# Patient Record
Sex: FEMALE | URBAN - METROPOLITAN AREA
[De-identification: names, ages, dates, MRNs, and addresses within clinical notes are randomized per-mention and may not be internally consistent; named-entity substitution may affect disease eponyms.]

---

## 2021-08-03 ENCOUNTER — Encounter (OUTPATIENT)
Dept: URBAN - METROPOLITAN AREA EXTERNAL CLINIC 14 | Facility: EXTERNAL CLINIC | Age: 71
End: 2021-08-03
Payer: COMMERCIAL

## 2021-08-03 ENCOUNTER — OFFICE VISIT (OUTPATIENT)
Dept: URBAN - METROPOLITAN AREA CLINIC 13 | Facility: CLINIC | Age: 71
End: 2021-08-03
Payer: COMMERCIAL

## 2021-08-03 DIAGNOSIS — Z96.1 PRESENCE OF PSEUDOPHAKIA: ICD-10-CM

## 2021-08-03 PROCEDURE — 99215 OFFICE O/P EST HI 40 MIN: CPT | Performed by: OPHTHALMOLOGY

## 2021-08-03 PROCEDURE — 67108 REPAIR DETACHED RETINA: CPT | Performed by: OPHTHALMOLOGY

## 2021-08-03 PROCEDURE — 76512 OPH US DX B-SCAN: CPT | Performed by: OPHTHALMOLOGY

## 2021-08-03 PROCEDURE — 92134 CPTRZ OPH DX IMG PST SGM RTA: CPT | Performed by: OPHTHALMOLOGY

## 2021-08-03 ASSESSMENT — INTRAOCULAR PRESSURE
OS: 45
OD: 19
OD: 19
OS: 45

## 2021-08-03 NOTE — IMPRESSION/PLAN
Impression: Corneal edema: H18.20. Plan: Has MCE OS. Likely due to elevated IOP. Likely will need to scrape epithelium during surgery.

## 2021-08-03 NOTE — IMPRESSION/PLAN
Impression: Glaucoma: H40.9. Plan: On Dorzolamide and Latanoprost. IOP still elevated. Will reassess after surgery.

## 2021-08-04 ENCOUNTER — POST-OPERATIVE VISIT (OUTPATIENT)
Dept: URBAN - METROPOLITAN AREA CLINIC 7 | Facility: CLINIC | Age: 71
End: 2021-08-04

## 2021-08-04 DIAGNOSIS — H33.332 MULTIPLE DEFECTS OF RETINA WITHOUT DETACHMENT, LEFT EYE: Primary | ICD-10-CM

## 2021-08-04 PROCEDURE — 99024 POSTOP FOLLOW-UP VISIT: CPT | Performed by: OPHTHALMOLOGY

## 2021-08-04 ASSESSMENT — INTRAOCULAR PRESSURE
OS: 29
OD: 18

## 2021-08-04 NOTE — IMPRESSION/PLAN
Impression: S/P 25g, PPV, EL, SO, interior PL, iris [place] x RRD OS - 1 Day. 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 Excellent post op course   Post operative instructions reviewed - Condition is improving - Plan: PF/Oflox QID. Continue glaucoma meds- Latanoprost, Azopt, Combigan. FDP during day sleep on right side. Driving route to [facility] discussed Gas precautions. RTC 1 week DFE OS --Advised patient to use artificial tears for comfort. --Continue Prednisolone acetate 1% and ofloxacin QID OS

## 2021-08-09 ENCOUNTER — POST-OPERATIVE VISIT (OUTPATIENT)
Dept: URBAN - METROPOLITAN AREA CLINIC 86 | Facility: CLINIC | Age: 71
End: 2021-08-09

## 2021-08-09 PROCEDURE — 99024 POSTOP FOLLOW-UP VISIT: CPT | Performed by: OPHTHALMOLOGY

## 2021-08-09 ASSESSMENT — INTRAOCULAR PRESSURE
OD: 14
OS: 15

## 2021-08-11 NOTE — IMPRESSION/PLAN
Impression: Retinal detachment with multiple breaks, left eye: H33.022.
-inferior mac off RD
-symptoms x 3 weeks Optos Colors:
OD: WNL
OS: inferior RD- poor view OCT:
OD: wnl OS: SRF through macula B-scan OS: inferior RD Plan: Examination reveals a retinal detachment. The diagnosis, natural history, and prognosis of rhegmatogenous retinal detachment, as well as the risks and benefits of intervention were discussed at length. . To reduce the risk of further visual loss, treatment is strongly recommended. The patient was informed of various surgical techniques; altitude precautions with a gas bubble, including the danger of loss of the eye with travel to a higher altitude or flying; and the possible need to update spectacle correction if a scleral buckle is used. The patient understands that the vision usually improves gradually over a period of several months to 1 year, but may not improve to prior levels. The patient elects to proceed with retinal detachment repair. Risk of redetachment, or proliferative vitreoretinopathy, scar formation, macular pucker, hyphema, glaucoma, hypotony, infection, loss of vision, loss of eye, blindness were fully explained to the patient who voices understanding and agreed to proceed with surgery PLAN: 25g PPV, EL, SO, inferior PI, iris hooks OS x RRD. Today Discussed with patient at length that this is a complex case due to elevated IOP, corneal edema, small pupil, and history of dislocated IOL. Understands that she has history of chronically poor vision in OS and that visual outcomes in this case are unpredictable.

## 2021-08-23 ENCOUNTER — POST-OPERATIVE VISIT (OUTPATIENT)
Dept: URBAN - METROPOLITAN AREA CLINIC 86 | Facility: CLINIC | Age: 71
End: 2021-08-23
Payer: COMMERCIAL

## 2021-08-23 PROCEDURE — 99024 POSTOP FOLLOW-UP VISIT: CPT | Performed by: OPHTHALMOLOGY

## 2021-08-23 ASSESSMENT — INTRAOCULAR PRESSURE
OS: 10
OD: 13

## 2021-08-23 NOTE — IMPRESSION/PLAN
Impression: S/P 25g, PPV, EL, SO, interior PL, iris [place] x RRD OS - 20 Days. 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 Excellent post op course   Post operative instructions reviewed - Condition is improving - Plan: Taper drops. Gas precautions. Sleep on side. RTC 6 weeks DFE OS OCT OS --Advised patient to use artificial tears for comfort.

## 2021-10-04 ENCOUNTER — POST-OPERATIVE VISIT (OUTPATIENT)
Dept: URBAN - METROPOLITAN AREA CLINIC 86 | Facility: CLINIC | Age: 71
End: 2021-10-04
Payer: COMMERCIAL

## 2021-10-04 DIAGNOSIS — H33.022 RETINAL DETACHMENT WITH MULTIPLE BREAKS, LEFT EYE: ICD-10-CM

## 2021-10-04 PROCEDURE — 99024 POSTOP FOLLOW-UP VISIT: CPT | Performed by: OPHTHALMOLOGY

## 2021-10-04 ASSESSMENT — INTRAOCULAR PRESSURE
OS: 20
OD: 20

## 2021-10-04 NOTE — IMPRESSION/PLAN
Impression: S/P 25G PPV, EL, SO, INFERIOR PL, IRIS HOO[place] X RRD OS - 62 Days. 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 Excellent post op course   Post operative instructions reviewed - Condition is improving -

OCT: WNL OU Plan: Doing well. Eye pain improved OS after restarting PF. PT will self-taper. Retina attached. SSRD

RTC 2 months DFE OU OCT OU --Advised patient to use artificial tears for comfort.

## 2021-11-29 ENCOUNTER — OFFICE VISIT (OUTPATIENT)
Dept: URBAN - METROPOLITAN AREA CLINIC 86 | Facility: CLINIC | Age: 71
End: 2021-11-29
Payer: COMMERCIAL

## 2021-11-29 DIAGNOSIS — H18.20 CORNEAL EDEMA: ICD-10-CM

## 2021-11-29 PROCEDURE — 99214 OFFICE O/P EST MOD 30 MIN: CPT | Performed by: OPHTHALMOLOGY

## 2021-11-29 PROCEDURE — 92134 CPTRZ OPH DX IMG PST SGM RTA: CPT | Performed by: OPHTHALMOLOGY

## 2021-11-29 RX ORDER — KETOROLAC TROMETHAMINE 5 MG/ML
0.5 % SOLUTION OPHTHALMIC
Qty: 5 | Refills: 2 | Status: INACTIVE
Start: 2021-11-29 | End: 2022-02-25

## 2021-11-29 RX ORDER — KETOROLAC TROMETHAMINE 4 MG/ML
0.4 % SOLUTION/ DROPS OPHTHALMIC
Qty: 5 | Refills: 2 | Status: INACTIVE
Start: 2021-11-29 | End: 2021-11-29

## 2021-11-29 ASSESSMENT — INTRAOCULAR PRESSURE
OD: 15
OS: 15

## 2021-11-29 NOTE — IMPRESSION/PLAN
Impression: Corneal edema: H18.20. Plan: Mild corneal edema. ? related to endothelial decompensation. Will treat with topical steroids for now.  If no improvement, consider cornea referral.

## 2021-11-29 NOTE — IMPRESSION/PLAN
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 Excellent post op course   Post operative instructions reviewed - Condition is improving -

OCT: inf CME OS Plan: Retina attached.  SSRD

## 2021-11-29 NOTE — IMPRESSION/PLAN
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 Plan: There are many etiologies of CME with the most common being low grade inflammation after intraocular surgery. The diagnosis, natural history, and prognosis of cystoid macular edema; as well as the risks and benefits of various treatment options were discussed. Possible treatment options include: topical corticosteroid and NSAID eye drops, abe-ocular triamcinolone injection, intravitreal steroid injection and less commonly intravitreal anti-VEGF injection along with the alternative of observation. The patient elects to proceed with PF/Acular QID.

## 2022-01-14 ENCOUNTER — OFFICE VISIT (OUTPATIENT)
Dept: URBAN - METROPOLITAN AREA CLINIC 86 | Facility: CLINIC | Age: 72
End: 2022-01-14
Payer: COMMERCIAL

## 2022-01-14 DIAGNOSIS — H40.9 GLAUCOMA: ICD-10-CM

## 2022-01-14 PROCEDURE — 92134 CPTRZ OPH DX IMG PST SGM RTA: CPT | Performed by: OPHTHALMOLOGY

## 2022-01-14 PROCEDURE — 99213 OFFICE O/P EST LOW 20 MIN: CPT | Performed by: OPHTHALMOLOGY

## 2022-01-14 ASSESSMENT — INTRAOCULAR PRESSURE
OD: 13
OS: 16

## 2022-01-14 NOTE — IMPRESSION/PLAN
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 Plan: There are many etiologies of CME with the most common being low grade inflammation after intraocular surgery. The diagnosis, natural history, and prognosis of cystoid macular edema; as well as the risks and benefits of various treatment options were discussed. Possible treatment options include: topical corticosteroid and NSAID eye drops, abe-ocular triamcinolone injection, intravitreal steroid injection and less commonly intravitreal anti-VEGF injection along with the alternative of observation. Discussed option of continuing topicals vs STK. The patient elects to proceed with PF/Acular QID. RTC 1 month DFE OU OCT OU Re-eval STK

## 2022-01-14 NOTE — IMPRESSION/PLAN
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 Excellent post op course   Post operative instructions reviewed - Condition is improving -

OCT: inf CME OS- improving Plan: Retina attached.  SSRD

## 2022-01-14 NOTE — IMPRESSION/PLAN
Impression: Corneal edema: H18.20. Plan: Improved with topical drops. Has some temporal anterior stromal haze.

## 2022-02-25 ENCOUNTER — OFFICE VISIT (OUTPATIENT)
Dept: URBAN - METROPOLITAN AREA CLINIC 86 | Facility: CLINIC | Age: 72
End: 2022-02-25
Payer: COMMERCIAL

## 2022-02-25 DIAGNOSIS — H35.352 CYSTOID MACULAR DEGENERATION, LEFT EYE: Primary | ICD-10-CM

## 2022-02-25 PROCEDURE — 92134 CPTRZ OPH DX IMG PST SGM RTA: CPT | Performed by: OPHTHALMOLOGY

## 2022-02-25 PROCEDURE — 99213 OFFICE O/P EST LOW 20 MIN: CPT | Performed by: OPHTHALMOLOGY

## 2022-02-25 RX ORDER — KETOROLAC TROMETHAMINE 5 MG/ML
0.5 % SOLUTION OPHTHALMIC
Qty: 5 | Refills: 2 | Status: ACTIVE
Start: 2022-02-25

## 2022-02-25 RX ORDER — PREDNISOLONE ACETATE 10 MG/ML
1 % SUSPENSION/ DROPS OPHTHALMIC
Qty: 10 | Refills: 4 | Status: ACTIVE
Start: 2022-02-25

## 2022-02-25 ASSESSMENT — INTRAOCULAR PRESSURE
OD: 17
OS: 14

## 2022-02-25 NOTE — IMPRESSION/PLAN
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 OCT:
OD: WNL
OS: resolved CME Plan: Doing well. Taper PF/Acular 3-2-1 (every 2 weeks) RTC 2 month DFE OU OCT OU Re-eval STK

## 2022-04-22 ENCOUNTER — OFFICE VISIT (OUTPATIENT)
Dept: URBAN - METROPOLITAN AREA CLINIC 86 | Facility: CLINIC | Age: 72
End: 2022-04-22
Payer: COMMERCIAL

## 2022-04-22 DIAGNOSIS — H35.352 CYSTOID MACULAR DEGENERATION, LEFT EYE: Primary | ICD-10-CM

## 2022-04-22 DIAGNOSIS — H33.332 MULTIPLE DEFECTS OF RETINA WITHOUT DETACHMENT, LEFT EYE: ICD-10-CM

## 2022-04-22 DIAGNOSIS — H40.9 GLAUCOMA: ICD-10-CM

## 2022-04-22 DIAGNOSIS — H18.20 CORNEAL EDEMA: ICD-10-CM

## 2022-04-22 PROCEDURE — 99214 OFFICE O/P EST MOD 30 MIN: CPT | Performed by: OPHTHALMOLOGY

## 2022-04-22 PROCEDURE — 92134 CPTRZ OPH DX IMG PST SGM RTA: CPT | Performed by: OPHTHALMOLOGY

## 2022-04-22 RX ORDER — PREDNISOLONE ACETATE 10 MG/ML
1 % SUSPENSION/ DROPS OPHTHALMIC
Qty: 10 | Refills: 3 | Status: ACTIVE
Start: 2022-04-22

## 2022-04-22 ASSESSMENT — INTRAOCULAR PRESSURE
OD: 15
OS: 19

## 2022-04-22 NOTE — IMPRESSION/PLAN
Impression: Corneal edema: H18.20. Plan: Has bullous keratopathy. ? corneal decompensation. Rec starting PF QID and Jennifer QID. Will refer to Cornea for evaluation.

## 2022-04-22 NOTE — IMPRESSION/PLAN
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 OCT: 04/22/22 OD: WNL
OS: resolved CME Plan: Doing well. Resolved RTC 3 month DFE OU OCT OU Re-eval STK

## 2022-07-29 ENCOUNTER — OFFICE VISIT (OUTPATIENT)
Dept: URBAN - METROPOLITAN AREA CLINIC 86 | Facility: CLINIC | Age: 72
End: 2022-07-29
Payer: COMMERCIAL

## 2022-07-29 DIAGNOSIS — H18.20 CORNEAL EDEMA: ICD-10-CM

## 2022-07-29 DIAGNOSIS — H40.9 GLAUCOMA: ICD-10-CM

## 2022-07-29 DIAGNOSIS — H35.352 CYSTOID MACULAR DEGENERATION, LEFT EYE: Primary | ICD-10-CM

## 2022-07-29 DIAGNOSIS — H33.332 MULTIPLE DEFECTS OF RETINA WITHOUT DETACHMENT, LEFT EYE: ICD-10-CM

## 2022-07-29 PROCEDURE — 99213 OFFICE O/P EST LOW 20 MIN: CPT | Performed by: OPHTHALMOLOGY

## 2022-07-29 PROCEDURE — 92134 CPTRZ OPH DX IMG PST SGM RTA: CPT | Performed by: OPHTHALMOLOGY

## 2022-07-29 ASSESSMENT — INTRAOCULAR PRESSURE
OD: 16
OS: 16

## 2022-07-29 NOTE — IMPRESSION/PLAN
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 OCT: 07/29/22 OD: WNL
OS: resolved CME Plan: Doing well. Resolved RTC 3 month DFE OU OCT OU Re-eval STK

## 2022-11-04 ENCOUNTER — OFFICE VISIT (OUTPATIENT)
Dept: URBAN - METROPOLITAN AREA CLINIC 86 | Facility: CLINIC | Age: 72
End: 2022-11-04
Payer: COMMERCIAL

## 2022-11-04 DIAGNOSIS — H35.352 CYSTOID MACULAR DEGENERATION, LEFT EYE: ICD-10-CM

## 2022-11-04 DIAGNOSIS — H40.9 GLAUCOMA: ICD-10-CM

## 2022-11-04 DIAGNOSIS — H18.20 CORNEAL EDEMA: ICD-10-CM

## 2022-11-04 DIAGNOSIS — H33.332 MULTIPLE DEFECTS OF RETINA WITHOUT DETACHMENT, LEFT EYE: Primary | ICD-10-CM

## 2022-11-04 PROCEDURE — 92134 CPTRZ OPH DX IMG PST SGM RTA: CPT | Performed by: OPHTHALMOLOGY

## 2022-11-04 PROCEDURE — 99213 OFFICE O/P EST LOW 20 MIN: CPT | Performed by: OPHTHALMOLOGY

## 2022-11-04 ASSESSMENT — INTRAOCULAR PRESSURE
OD: 21
OS: 17

## 2022-11-04 NOTE — IMPRESSION/PLAN
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 S/P 25G PPV, EL, SO, INFERIOR PL, IRIS HOOKS X RRD OS

OCT: inf CME OS- improving Plan: Retina attached.  SSRD

## 2022-11-04 NOTE — IMPRESSION/PLAN
Impression: Corneal edema: H18.20. Plan: PF BID and Jennifer QID.  
Scheduled for corneal transplant on 11/15/22 with Dr Quach Koffi

## 2022-11-04 NOTE — IMPRESSION/PLAN
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 OCT: 11/04/22 OD: WNL
OS: resolved CME Plan: Doing well. Resolved RTC 3 month DFE OU OCT OU Re-eval STK